# Patient Record
Sex: FEMALE | Race: WHITE | NOT HISPANIC OR LATINO | Employment: FULL TIME | URBAN - METROPOLITAN AREA
[De-identification: names, ages, dates, MRNs, and addresses within clinical notes are randomized per-mention and may not be internally consistent; named-entity substitution may affect disease eponyms.]

---

## 2017-11-17 ENCOUNTER — TELEPHONE (OUTPATIENT)
Dept: CARDIOLOGY | Facility: CLINIC | Age: 57
End: 2017-11-17

## 2017-11-17 NOTE — TELEPHONE ENCOUNTER
11/17/17  5:11 PM  Natty Levine  1960    Home Phone [phone]   Work Phone [phone]   Mobile [phone]     Ms. Levine was transferred to me by scheduling. She states she needs to be seen by the end of the year. She is concerned that her blockage has gotten worse. I asked if she has been having chest pain. She states she can't tell me about her symptoms because she at work and doesn't want other people to over hear.     I asked if I could call her back on Monday. She states that I should just let Dr. Motta know she called.     Does she need to be seen? I've held a spot for her on 12/1 at 0920 on your schedule.    Salud ANTONIO RN

## 2017-12-01 ENCOUNTER — OFFICE VISIT (OUTPATIENT)
Dept: CARDIOLOGY | Facility: CLINIC | Age: 57
End: 2017-12-01

## 2017-12-01 ENCOUNTER — HOSPITAL ENCOUNTER (OUTPATIENT)
Dept: CARDIOLOGY | Facility: HOSPITAL | Age: 57
Discharge: HOME | End: 2017-12-01
Attending: INTERNAL MEDICINE | Admitting: INTERNAL MEDICINE

## 2017-12-01 VITALS — WEIGHT: 152 LBS | BODY MASS INDEX: 27.97 KG/M2 | HEIGHT: 62 IN

## 2017-12-01 DIAGNOSIS — E78.2 MIXED HYPERLIPIDEMIA: ICD-10-CM

## 2017-12-01 DIAGNOSIS — I25.110 CORONARY ARTERY DISEASE INVOLVING NATIVE CORONARY ARTERY OF NATIVE HEART WITH UNSTABLE ANGINA PECTORIS (HCC): Primary | ICD-10-CM

## 2017-12-01 LAB
BH CV NUCLEAR PRIOR STUDY: 2
BH CV STRESS BP STAGE 1: NORMAL
BH CV STRESS COMMENTS STAGE 1: NORMAL
BH CV STRESS DOSE REGADENOSON STAGE 1: 0.4
BH CV STRESS DURATION MIN STAGE 1: 0
BH CV STRESS DURATION SEC STAGE 1: 15
BH CV STRESS HR STAGE 1: 103
BH CV STRESS PROTOCOL 1: NORMAL
BH CV STRESS RECOVERY BP: NORMAL MMHG
BH CV STRESS RECOVERY HR: 75 BPM
BH CV STRESS STAGE 1: 1
LV EF NUC BP: 52 %
MAXIMAL PREDICTED HEART RATE: 163 BPM
PERCENT MAX PREDICTED HR: 63.19 %
STRESS BASELINE BP: NORMAL MMHG
STRESS BASELINE HR: 74 BPM
STRESS PERCENT HR: 74 %
STRESS POST EXERCISE DUR SEC: 15 SEC
STRESS POST PEAK BP: NORMAL MMHG
STRESS POST PEAK HR: 103 BPM
STRESS TARGET HR: 139 BPM

## 2017-12-01 PROCEDURE — 99214 OFFICE O/P EST MOD 30 MIN: CPT | Performed by: INTERNAL MEDICINE

## 2017-12-01 PROCEDURE — 25010000002 REGADENOSON 0.4 MG/5ML SOLUTION: Performed by: INTERNAL MEDICINE

## 2017-12-01 PROCEDURE — 78452 HT MUSCLE IMAGE SPECT MULT: CPT | Performed by: INTERNAL MEDICINE

## 2017-12-01 PROCEDURE — 93000 ELECTROCARDIOGRAM COMPLETE: CPT | Performed by: INTERNAL MEDICINE

## 2017-12-01 PROCEDURE — 93018 CV STRESS TEST I&R ONLY: CPT | Performed by: INTERNAL MEDICINE

## 2017-12-01 PROCEDURE — 78452 HT MUSCLE IMAGE SPECT MULT: CPT

## 2017-12-01 PROCEDURE — 0 TECHNETIUM TETROFOSMIN KIT: Performed by: INTERNAL MEDICINE

## 2017-12-01 PROCEDURE — 93017 CV STRESS TEST TRACING ONLY: CPT

## 2017-12-01 PROCEDURE — A9502 TC99M TETROFOSMIN: HCPCS | Performed by: INTERNAL MEDICINE

## 2017-12-01 PROCEDURE — 93016 CV STRESS TEST SUPVJ ONLY: CPT | Performed by: INTERNAL MEDICINE

## 2017-12-01 RX ORDER — ESCITALOPRAM OXALATE 10 MG/1
TABLET ORAL
Refills: 5 | COMMUNITY
Start: 2017-11-06 | End: 2021-02-12

## 2017-12-01 RX ADMIN — REGADENOSON 0.4 MG: 0.08 INJECTION, SOLUTION INTRAVENOUS at 11:00

## 2017-12-01 RX ADMIN — TETROFOSMIN 1 DOSE: 1.38 INJECTION, POWDER, LYOPHILIZED, FOR SOLUTION INTRAVENOUS at 10:05

## 2017-12-01 RX ADMIN — TETROFOSMIN 1 DOSE: 1.38 INJECTION, POWDER, LYOPHILIZED, FOR SOLUTION INTRAVENOUS at 11:00

## 2017-12-01 NOTE — PROGRESS NOTES
Date of Office Visit: 17  Encounter Provider: Morgan Motta MD  Place of Service: [facility] CARDIOLOGY  Patient Name: Natty Levine  :1960      Chief Complaint   Patient presents with   • Coronary Artery Disease     History of Present Illness  HPI Comments: The patient is a 55-year-old female who has a history of severe coronary disease with  acute anterior myocardial infarction in . She had angioplasty of the left anterior  descending. We then saw her in 2012 with atypical pain in her back. At that time, she  had a perfusion stress test that showed a large area of infarct but no ischemia. The left  ventricular ejection fraction then was 36%. She then presented on 2014, with  increasing back discomfort worrisome for angina and was transferred to [facility], [place], operated by [facility]. Because her symptoms were progressive, she underwent cardiac catheterization then  that showed a left ventricular ejection fraction of 55%. Wall motion was reportedly  normal. Her stent in the left anterior descending was patent. There was diffuse 40%  stenosis in the very distal left anterior descending, at that point, only a 1.5 mm vessel.  There was insignificant disease of the right coronary artery and circumflex. She now  comes in for follow-up.  It's really hard to get him on some of her symptoms but her main complaint she is just fatigued and tired.  She does get some chest pain when she overexerts her when she sleeps on her left or right side.  She has some shortness of breath when she lifts her carry something but other than abnormal activity is fine.  She is occasional palpitations but no near-syncope or syncope.  She does say she has less energy now and is sleeping more.  She was tested for sleep apnea year ago and apparently was negative.  She denies any stroke type symptoms such as paralysis or paresthesias or dysarthria.  Denies any blood in her stool or black tarry  stools.    Coronary Artery Disease   Pertinent negatives include no dizziness, muscle weakness or weight gain. Her past medical history is significant for past myocardial infarction.         Past Medical History:   Diagnosis Date   • Anxiety    • ASCVD (arteriosclerotic cardiovascular disease)    • Hyperlipidemia    • Inguinal hernia    • Myocardial infarct          Past Surgical History:   Procedure Laterality Date   • CARDIAC CATHETERIZATION      20-30% proxmal left circumflex, 20% mid LAD, 10% midRCA.   •  SECTION     • CORONARY ANGIOPLASTY     • CORONARY STENT PLACEMENT      stent placed in the LAD         Current Outpatient Prescriptions on File Prior to Visit   Medication Sig Dispense Refill   • aspirin 325 MG tablet Take  by mouth daily.     • simvastatin (ZOCOR) 40 MG tablet Take 1 tablet by mouth daily. 90 tablet 0     No current facility-administered medications on file prior to visit.          Social History     Social History   • Marital status: Single     Spouse name: N/A   • Number of children: N/A   • Years of education: N/A     Occupational History   • Not on file.     Social History Main Topics   • Smoking status: Current Every Day Smoker     Packs/day: 0.50   • Smokeless tobacco: Not on file   • Alcohol use No   • Drug use: Not on file   • Sexual activity: Not on file     Other Topics Concern   • Not on file     Social History Narrative       Family History   Problem Relation Age of Onset   • Hypertension Mother    • Stroke Father    • Heart disease Maternal Grandmother    • Lung cancer Paternal Grandfather          Review of Systems   Constitution: Positive for malaise/fatigue. Negative for decreased appetite, diaphoresis, fever, weakness, weight gain and weight loss.   HENT: Negative for congestion, hearing loss, nosebleeds and tinnitus.    Eyes: Negative for blurred vision, double vision, vision loss in left eye, vision loss in right eye and visual disturbance.   Cardiovascular:         "As noted in HPI   Respiratory:        As noted HPI   Endocrine: Negative for cold intolerance and heat intolerance.   Hematologic/Lymphatic: Negative for bleeding problem. Does not bruise/bleed easily.   Skin: Negative for color change, flushing, itching and rash.   Musculoskeletal: Negative for arthritis, back pain, joint pain, joint swelling, muscle weakness and myalgias.   Gastrointestinal: Negative for bloating, abdominal pain, constipation, diarrhea, dysphagia, heartburn, hematemesis, hematochezia, melena, nausea and vomiting.   Genitourinary: Negative for bladder incontinence, dysuria, frequency, nocturia and urgency.   Neurological: Positive for headaches. Negative for dizziness, focal weakness, light-headedness, loss of balance, numbness, paresthesias and vertigo.   Psychiatric/Behavioral: Negative for depression, memory loss and substance abuse.       Procedures      ECG 12 Lead  Date/Time: 12/1/2017 9:55 AM  Performed by: SELMA WRIGHT  Authorized by: SELMA WIRGHT   Comparison: compared with previous ECG   Comparison to previous ECG: Anterior T wave inversion is more pronounced.  Rhythm: sinus rhythm  Rate: normal  T depression: I, aVL, V2, V4, V3, V5 and V6  QRS axis: normal  Q wave noted on lead: old anterior MI.                 Objective:    Ht 62\" (157.5 cm)  Wt 152 lb (68.9 kg)  BMI 27.8 kg/m2       Physical Exam  Physical Exam   Constitutional: She is oriented to person, place, and time. She appears well-developed and well-nourished. No distress.   HENT:   Head: Normocephalic.   Eyes: Conjunctivae are normal. Pupils are equal, round, and reactive to light. No scleral icterus.   Neck: Normal carotid pulses, no hepatojugular reflux and no JVD present. Carotid bruit is not present. No tracheal deviation, no edema and no erythema present. No thyromegaly present.   Cardiovascular: Normal rate, regular rhythm, S1 normal, S2 normal, normal heart sounds and intact distal pulses.   No extrasystoles " are present. PMI is not displaced.  Exam reveals no gallop, no distant heart sounds and no friction rub.    No murmur heard.  Pulses:       Carotid pulses are 2+ on the right side, and 2+ on the left side.       Radial pulses are 2+ on the right side, and 2+ on the left side.        Femoral pulses are 2+ on the right side, and 2+ on the left side.       Dorsalis pedis pulses are 2+ on the right side, and 2+ on the left side.        Posterior tibial pulses are 2+ on the right side, and 2+ on the left side.   Pulmonary/Chest: Effort normal and breath sounds normal. No respiratory distress. She has no decreased breath sounds. She has no wheezes. She has no rhonchi. She has no rales. She exhibits no tenderness.   Abdominal: Soft. Bowel sounds are normal. She exhibits no distension and no mass. There is no hepatosplenomegaly. There is no tenderness. There is no rebound and no guarding.   Musculoskeletal: She exhibits no edema, tenderness or deformity.   Neurological: She is alert and oriented to person, place, and time.   Skin: Skin is warm and dry. No rash noted. She is not diaphoretic. No cyanosis or erythema. No pallor. Nails show no clubbing.   Psychiatric: She has a normal mood and affect. Her speech is normal and behavior is normal. Judgment and thought content normal.           Assessment:   1. This is a 57-year-old female with a previous anterior infarct, previous moderate left ventricular systolic dysfunction status post angioplasty and stent placement of the left  anterior descending. Follow-up catheterization for atypical chest pain in 07/2014 showed no evidence of restenosis; however, diffuse 40% stenosis of the very distal small left  anterior descending, and normal ejection fraction of 55%.   Coronary Artery Disease  Assessment  • The patient has CCS class III - angina with activities of daily living  • There is a new diagnosis of stable angina in the past 12 months  • The patient is having symptoms  consistent with unstable angina     Plan  • Lifestyle modifications discussed include adhering to a heart healthy diet, avoidance of tobacco products, medication compliance, regular exercise and regular monitoring of cholesterol and blood pressure    Subjective - Objective  • There is a history of past MI  • There has been a previous stent procedure using LACY  • Current antiplatelet therapy includes aspirin 81 mg    Now with some symptoms worrisome for ischemia the fatigue atypical chest pain and some dyspnea her ECG in addition is a little bit more abnormal.  She's had a perfusion stress test as a non-walking protocol.  If that is unremarkable would follow her clinically.    2. Hyperlipidemia. She is on simvastatin. She should have follow-up lipid profiles with targeted LDL of way below 70.   3. Cigarette smoking. We discussed again the need for her to discontinue that.     4.  Anxiety.  Now on Lexapro.         Plan:

## 2017-12-02 ENCOUNTER — TELEPHONE (OUTPATIENT)
Dept: CARDIOLOGY | Facility: CLINIC | Age: 57
End: 2017-12-02

## 2017-12-02 NOTE — TELEPHONE ENCOUNTER
Natty Levine  Female, 57 y.o., 1960  PCP:   Louis Reeder MD  Language:   English  Need Interp:   No  Last Weight:   152 lb (68.9 kg)  Phone:   M: [phone] H: [phone] W: [phone]  Allergies  Codeine  Health Maintenance:   Due  FYI:   None  Primary Ins.:   CARLOS WARREN  MRN:   [id number]  MyChart:   Code Exp  Pharmacy:   [address] [phone] [facility] [phone] FX [[id number]]  Preferred Lab:   None  Next Appt with Me:   None  Next Appt Date by Dept:   None    PACS Images        Radiology Images         Stress Test With Myocardial Perfusion One Day   Status:  Final result   Visible to patient:  No (Not Released) Dx:  Coronary artery disease involving darion... Order: [id number]         Details        Reading Physician Reading Date Result Priority       MD Connor Eller MD Jamie D Kemp, MD 12/1/2017 12/1/2017 12/1/2017 Routine           Result Text             · Myocardial perfusion imaging indicates a small-to-medium-sized infarct located in the distal anterior wall and apex with no significant ischemia noted.  · Left ventricular ejection fraction is borderline normal (Calculated EF = 52%).  · Impressions are consistent with a low risk study.                    All Measurements          Ref Range & Units 1d ago         Nuclear Prior Study  2       BH CV STRESS PROTOCOL 1  Pharmacologic       Stage 1  1       HR Stage 1  103       BP Stage 1  126/80       Duration Min Stage 1  0       Duration Sec Stage 1  15       Stress Dose Regadenoson Stage 1  0.4       Stress Comments Stage 1  15 sec bolus injection       Baseline HR bpm 74       Baseline BP mmHg 130/84       Peak HR bpm 103       Percent Max Pred HR % 63.19       Percent Target HR % 74       Peak BP mmHg 126/80       Recovery HR bpm 75       Recovery BP mmHg 132/80       Target HR (85%) bpm 139       Max. Pred. HR (100%) bpm 163       Exercise duration (sec) sec 15       Nuc Stress  EF % 52                       [facility] NUC [facility]  [address] [facility]  [address]  [facility] [address] [phone]            Stress Data        Stage HR (bpm) BP (mmHg) Minutes Seconds Dose (mg) Comments       1        103        126/80        0        15        0.4        15 sec bolus injection                  Stress Measurements        Baseline Vitals   Baseline HR 74 bpm        Baseline /84 mmHg         Peak Stress Vitals   Peak  bpm        Peak /80 mmHg         Recovery Vitals   Recovery HR 75 bpm        Recovery /80 mmHg         Exercise Data   Target HR (85%) 139 bpm        Max. Pred. HR (100%) 163 bpm        Percent Max Pred HR 63.19 %        Exercise duration (sec) 15 sec                 Stress Procedure        Rest ECG  Baseline ECG of normal sinus rhythm noted. Poor R wave progression noted.   OH interval = 160 ms. QRS complex = 80 ms. There was no ST segment deviation noted.       Stress ECG  Stress ECG of normal sinus rhythm noted.   There was no ST segment deviation noted during stress.   There were no arrhythmias during stress.   Arrhythmias during recovery: occasional PVCs.   Arrhythmias were not significant.   ECG was interpretable.   Indeterminate stress ECG interpretation.       Stress Description  A pharmacological stress test was performed using regadenoson with low-level exercise.   The patient reached the end of the protocol.   The patient reported dyspnea during the stress test. Symptoms were not clinically significant.           Nuclear Perfusion Findings        Study Impression  Myocardial perfusion imaging indicates a small-to-medium-sized infarct located in the anterior wall and apex with no significant ischemia noted. Impressions are consistent with a low risk study.       Rest Perfusion Defect 1  There is a small to moderately sized defect of severe severity present in the apical anterior and apical wall.       Rest Perfusion Defect 2  There  is a small to moderately sized defect of severe severity present in the apical anterior and apical wall.       Ventricle Size / Description  Left ventricular ejection fraction is borderline normal (Calculated EF = 52%). Abnormal LV wall motion. Apical akinesis.           PACS Images        Show images for Stress Test With Myocardial Perfusion One Day                 Last Resulted: 12/01/17  4:17 PM                            Status of Other Orders        Order    Lab Status Result Date Provider Status       ECG 12 Lead Final result 12/1/2017 Open                  Routing History        Priority Sent On From To Message Type        12/1/2017  4:19 PM MD Morgan Eller MD Results

## 2021-02-12 ENCOUNTER — HOSPITAL ENCOUNTER (OUTPATIENT)
Dept: GENERAL RADIOLOGY | Facility: HOSPITAL | Age: 61
Discharge: HOME | End: 2021-02-12
Admitting: NURSE PRACTITIONER

## 2021-02-12 DIAGNOSIS — R06.89 ABNORMAL BREATH SOUNDS: ICD-10-CM

## 2021-02-12 PROBLEM — F17.200 TOBACCO DEPENDENCE SYNDROME: Status: ACTIVE | Noted: 2017-06-06

## 2021-02-12 PROBLEM — J30.9 ALLERGIC RHINITIS: Status: ACTIVE | Noted: 2018-06-04

## 2021-02-12 PROCEDURE — 71046 X-RAY EXAM CHEST 2 VIEWS: CPT
